# Patient Record
Sex: FEMALE | Race: WHITE | NOT HISPANIC OR LATINO | Employment: FULL TIME | URBAN - NONMETROPOLITAN AREA
[De-identification: names, ages, dates, MRNs, and addresses within clinical notes are randomized per-mention and may not be internally consistent; named-entity substitution may affect disease eponyms.]

---

## 2018-02-05 ENCOUNTER — HISTORICAL (OUTPATIENT)
Dept: ADMINISTRATIVE | Facility: HOSPITAL | Age: 58
End: 2018-02-05

## 2019-09-11 ENCOUNTER — HISTORICAL (OUTPATIENT)
Dept: RADIOLOGY | Facility: HOSPITAL | Age: 59
End: 2019-09-11

## 2021-08-02 ENCOUNTER — HISTORICAL (OUTPATIENT)
Dept: RADIOLOGY | Facility: HOSPITAL | Age: 61
End: 2021-08-02

## 2021-09-01 ENCOUNTER — HISTORICAL (OUTPATIENT)
Dept: RADIOLOGY | Facility: HOSPITAL | Age: 61
End: 2021-09-01

## 2022-02-16 ENCOUNTER — HISTORICAL (OUTPATIENT)
Dept: ADMINISTRATIVE | Facility: HOSPITAL | Age: 62
End: 2022-02-16

## 2022-02-16 ENCOUNTER — HISTORICAL (OUTPATIENT)
Dept: SURGERY | Facility: HOSPITAL | Age: 62
End: 2022-02-16

## 2022-02-16 LAB
ABS NEUT (OLG): 2.3 (ref 1.5–6.9)
ALBUMIN SERPL-MCNC: 3.7 G/DL (ref 3.4–4.8)
ALBUMIN/GLOB SERPL: 1.2 {RATIO} (ref 1.1–2)
ALP SERPL-CCNC: 102 U/L (ref 40–150)
ALT SERPL-CCNC: 13 U/L (ref 0–55)
APTT PPP: 27.5 S (ref 23.4–34.9)
AST SERPL-CCNC: 17 U/L (ref 5–34)
BASOPHILS NFR BLD MANUAL: 0 % (ref 0–1)
BILIRUB SERPL-MCNC: 0.4 MG/DL
BILIRUBIN DIRECT+TOT PNL SERPL-MCNC: 0.2 (ref 0–0.5)
BILIRUBIN DIRECT+TOT PNL SERPL-MCNC: 0.2 (ref 0–0.8)
BUN SERPL-MCNC: 14 MG/DL (ref 9.8–20.1)
CALCIUM SERPL-MCNC: 9.2 MG/DL (ref 8.7–10.5)
CHLORIDE SERPL-SCNC: 103 MMOL/L (ref 98–107)
CO2 SERPL-SCNC: 29 MMOL/L (ref 23–31)
CREAT SERPL-MCNC: 0.78 MG/DL (ref 0.55–1.02)
EOSINOPHIL NFR BLD MANUAL: 1 % (ref 0–5)
ERYTHROCYTE [DISTWIDTH] IN BLOOD BY AUTOMATED COUNT: 14.5 % (ref 11.5–17)
GLOBULIN SER-MCNC: 3.2 G/DL (ref 2.4–3.5)
GLUCOSE SERPL-MCNC: 83 MG/DL (ref 82–115)
GRANULOCYTES NFR BLD MANUAL: 56 % (ref 47–80)
HCT VFR BLD AUTO: 36.9 % (ref 36–48)
HEMOLYSIS INTERF INDEX SERPL-ACNC: 1
HGB BLD-MCNC: 11.9 G/DL (ref 12–16)
ICTERIC INTERF INDEX SERPL-ACNC: 1
INR PPP: 1 (ref 2–3)
LIPEMIC INTERF INDEX SERPL-ACNC: 2
LYMPHOCYTES NFR BLD MANUAL: 36 % (ref 15–40)
MANUAL DIFF? (OHS): YES
MCH RBC QN AUTO: 30 PG (ref 27–34)
MCHC RBC AUTO-ENTMCNC: 32 G/DL (ref 31–36)
MCV RBC AUTO: 94 FL (ref 80–99)
MONOCYTES NFR BLD MANUAL: 6 % (ref 4–12)
NEUTS BAND NFR BLD MANUAL: 1 % (ref 1–5)
PLATELET # BLD AUTO: 254 10*3/UL (ref 140–400)
PLATELET # BLD EST: ADEQUATE 10*3/UL
PMV BLD AUTO: 8.9 FL (ref 6.8–10)
POTASSIUM SERPL-SCNC: 4.3 MMOL/L (ref 3.5–5.1)
PROT SERPL-MCNC: 6.9 G/DL (ref 5.8–7.6)
PROTHROMBIN TIME: 12.9 S (ref 11.7–14.5)
RBC # BLD AUTO: 3.93 10*6/UL (ref 4.2–5.4)
SODIUM SERPL-SCNC: 139 MMOL/L (ref 136–145)
WBC # SPEC AUTO: 3.9 10*3/UL (ref 4.5–11.5)

## 2022-02-21 ENCOUNTER — HISTORICAL (OUTPATIENT)
Dept: ANESTHESIOLOGY | Facility: HOSPITAL | Age: 62
End: 2022-02-21

## 2022-05-14 NOTE — OP NOTE
DATE OF SURGERY:        SURGEON:  Cornel Bautista M.D.    ADMITTING DIAGNOSIS:  Need for age-appropriate screening colonoscopy.    PROCEDURE:  Colonoscopy to the cecum with intubation of ileocecal valve.    POSTOPERATIVE DIAGNOSES:    1. Normal terminal ileum up to 10 cm.  2. Normal colonoscopy.  3. Grade 2-3 internal hemorrhoids with external skin tags noted.    PLAN:    1. Discussion of PPH stapling of hemorrhoids.  2. Follow up in 2 weeks, discussion of colonoscopy.  3. Follow up in 2 years, recheck stools for blood.  4. Recheck colonoscopy in 5-10 years.    BRIEF HISTORY:  The patient is a 61-year-old  female with a history of hypertension, anxiety, depressive disorder who had anorectal pain and tenderness that was worse last Thursday with blood per rectum.  Patient needed a colonoscopy.    DESCRIPTION OF PROCEDURE:  Underwent colonoscopy to the cecum with intubation of ileocecal valve.  The terminal ileum was normal up to 10 cm.  Cecum, ascending colon, transverse colon, descending colon were normal.  Rectosigmoid had some diverticulosis associated with it, minimal at best.  The patient had grade 2-3 internal hemorrhoids with associated external skin tags.    PLAN:    1. Follow up in the office to discuss results.  2. Follow up and discuss possible PPH stapling of hemorrhoids as well as removal of external skin tags.  3. Recheck stools in 2 years.  4. Recheck colonoscopy in 10 years.      I appreciate the consultation referral from Dr. Crawley.        BBS/MODL   DD: 02/21/2022 1410   DT: 02/21/2022 1432  Job # [id number]    cc: Dr. Crawley

## 2022-10-20 DIAGNOSIS — Z12.31 ENCOUNTER FOR SCREENING MAMMOGRAM FOR BREAST CANCER: Primary | ICD-10-CM

## 2022-11-02 ENCOUNTER — HOSPITAL ENCOUNTER (OUTPATIENT)
Dept: RADIOLOGY | Facility: HOSPITAL | Age: 62
Discharge: HOME | End: 2022-11-02
Attending: FAMILY MEDICINE
Payer: COMMERCIAL

## 2022-11-02 DIAGNOSIS — Z12.31 ENCOUNTER FOR SCREENING MAMMOGRAM FOR BREAST CANCER: ICD-10-CM

## 2022-11-02 PROCEDURE — 77067 MAMMO DIGITAL SCREENING BILAT WITH TOMO: ICD-10-PCS | Mod: 26,,, | Performed by: STUDENT IN AN ORGANIZED HEALTH CARE EDUCATION/TRAINING PROGRAM

## 2022-11-02 PROCEDURE — 77063 BREAST TOMOSYNTHESIS BI: CPT | Mod: 26,,, | Performed by: STUDENT IN AN ORGANIZED HEALTH CARE EDUCATION/TRAINING PROGRAM

## 2022-11-02 PROCEDURE — 77067 SCR MAMMO BI INCL CAD: CPT | Mod: 26,,, | Performed by: STUDENT IN AN ORGANIZED HEALTH CARE EDUCATION/TRAINING PROGRAM

## 2022-11-02 PROCEDURE — 77067 SCR MAMMO BI INCL CAD: CPT | Mod: TC

## 2022-11-02 PROCEDURE — 77063 MAMMO DIGITAL SCREENING BILAT WITH TOMO: ICD-10-PCS | Mod: 26,,, | Performed by: STUDENT IN AN ORGANIZED HEALTH CARE EDUCATION/TRAINING PROGRAM

## 2024-04-16 DIAGNOSIS — Z12.31 OTHER SCREENING MAMMOGRAM: Primary | ICD-10-CM

## 2024-04-30 ENCOUNTER — HOSPITAL ENCOUNTER (OUTPATIENT)
Dept: RADIOLOGY | Facility: HOSPITAL | Age: 64
Discharge: HOME | End: 2024-04-30
Attending: FAMILY MEDICINE
Payer: COMMERCIAL

## 2024-04-30 DIAGNOSIS — Z12.31 OTHER SCREENING MAMMOGRAM: ICD-10-CM

## 2024-04-30 PROCEDURE — 77067 SCR MAMMO BI INCL CAD: CPT | Mod: 26,,, | Performed by: STUDENT IN AN ORGANIZED HEALTH CARE EDUCATION/TRAINING PROGRAM

## 2024-04-30 PROCEDURE — 77067 SCR MAMMO BI INCL CAD: CPT | Mod: TC

## 2024-04-30 PROCEDURE — 77063 BREAST TOMOSYNTHESIS BI: CPT | Mod: 26,,, | Performed by: STUDENT IN AN ORGANIZED HEALTH CARE EDUCATION/TRAINING PROGRAM

## 2025-03-26 DIAGNOSIS — Z78.0 POSTMENOPAUSAL: Primary | ICD-10-CM

## 2025-03-26 DIAGNOSIS — Z12.31 OTHER SCREENING MAMMOGRAM: Primary | ICD-10-CM

## 2025-05-15 ENCOUNTER — HOSPITAL ENCOUNTER (OUTPATIENT)
Dept: RADIOLOGY | Facility: HOSPITAL | Age: 65
Discharge: HOME | End: 2025-05-15
Attending: FAMILY MEDICINE
Payer: COMMERCIAL

## 2025-05-15 DIAGNOSIS — Z78.0 POSTMENOPAUSAL: ICD-10-CM

## 2025-05-15 DIAGNOSIS — Z12.31 OTHER SCREENING MAMMOGRAM: ICD-10-CM

## 2025-05-15 PROCEDURE — 77067 SCR MAMMO BI INCL CAD: CPT | Mod: TC

## 2025-05-15 PROCEDURE — 77080 DXA BONE DENSITY AXIAL: CPT | Mod: TC
